# Patient Record
Sex: FEMALE | Race: WHITE | NOT HISPANIC OR LATINO | Employment: UNEMPLOYED | URBAN - NONMETROPOLITAN AREA
[De-identification: names, ages, dates, MRNs, and addresses within clinical notes are randomized per-mention and may not be internally consistent; named-entity substitution may affect disease eponyms.]

---

## 2019-11-07 ENCOUNTER — PROCEDURE VISIT (OUTPATIENT)
Dept: OBSTETRICS AND GYNECOLOGY | Facility: CLINIC | Age: 29
End: 2019-11-07

## 2019-11-07 VITALS
WEIGHT: 160 LBS | BODY MASS INDEX: 27.31 KG/M2 | DIASTOLIC BLOOD PRESSURE: 58 MMHG | SYSTOLIC BLOOD PRESSURE: 102 MMHG | HEIGHT: 64 IN

## 2019-11-07 DIAGNOSIS — Z01.419 ENCOUNTER FOR GYNECOLOGICAL EXAMINATION WITHOUT ABNORMAL FINDING: Primary | ICD-10-CM

## 2019-11-07 DIAGNOSIS — N92.0 MENORRHAGIA WITH REGULAR CYCLE: ICD-10-CM

## 2019-11-07 PROCEDURE — 87624 HPV HI-RISK TYP POOLED RSLT: CPT | Performed by: OBSTETRICS & GYNECOLOGY

## 2019-11-07 PROCEDURE — 99385 PREV VISIT NEW AGE 18-39: CPT | Performed by: OBSTETRICS & GYNECOLOGY

## 2019-11-07 PROCEDURE — G0123 SCREEN CERV/VAG THIN LAYER: HCPCS | Performed by: OBSTETRICS & GYNECOLOGY

## 2019-11-07 RX ORDER — NORGESTIMATE AND ETHINYL ESTRADIOL 0.25-0.035
1 KIT ORAL DAILY
Qty: 1 PACKAGE | Refills: 12 | Status: SHIPPED | OUTPATIENT
Start: 2019-11-07 | End: 2020-11-25 | Stop reason: SDUPTHER

## 2019-11-07 RX ORDER — VALACYCLOVIR HYDROCHLORIDE 500 MG/1
500 TABLET, FILM COATED ORAL EVERY 12 HOURS
Refills: 0 | COMMUNITY
Start: 2019-10-16 | End: 2019-11-07

## 2019-11-07 RX ORDER — BUSPIRONE HYDROCHLORIDE 10 MG/1
10 TABLET ORAL 2 TIMES DAILY
Refills: 1 | COMMUNITY
Start: 2019-10-16 | End: 2022-08-01

## 2019-11-07 NOTE — PROGRESS NOTES
[facility]  Gynecology    CC: Annual well woman exam    HPI  Mackenzie Richey is a 29 y.o.  premenopausal female who presents for her annual well woman exam.  The patient has no complaints other than some pain with removing super tampons.  Denies any vaginal itching, burning, irritation, or discharge.  Denies any abnormal uterine bleeding.  Continues to be sexually active.  Denies any sexual dysfunction concerns.  Denies any urinary symptoms including incontinence, dysuria, frequency, urgency, nocturia.    She exercises regularly: yes.  She wears her seat belt:yes.  She has concerns about domestic violence: no.  She has noticed changes in height: no.    ROS  Review of Systems   Constitutional: Negative.    HENT: Negative.    Eyes: Negative.    Respiratory: Negative.    Cardiovascular: Negative.    Gastrointestinal: Negative.    Endocrine: Negative.    Genitourinary: Negative.    Musculoskeletal: Negative.    Skin: Negative.    Allergic/Immunologic: Negative.    Neurological: Negative.    Hematological: Negative.    Psychiatric/Behavioral: The patient is nervous/anxious.      HEALTH MAINTENANCE  Mammogram: N/A  Colonoscopy: N/A  DEXA scan: N/A  Pap smear:   Last Completed Pap Smear       Status Date      PAP SMEAR Done 2011 CONVERTED (HISTORICAL) GYN CYTOLOGY     Patient has more history with this topic...        GYN HISTORY  Menarche: 9  Menses: Regular, every 28 days, lasts 4-5 days, first ++ heavy first 1-2 days, no intermenstrual bleeding  History of STIs: none  Last pap smear:   Last Completed Pap Smear       Status Date      PAP SMEAR Done 2011 CONVERTED (HISTORICAL) GYN CYTOLOGY     Patient has more history with this topic...      Abnormal pap smear history: 2017 per patient- ASCUS, repeat negative  Contraception: Condoms    OB HISTORY  OB History    Para Term  AB Living   2 1 1   1 1   SAB TAB Ectopic Molar Multiple Live Births   1         1      # Outcome  "Date GA Lbr Adebayo/2nd Weight Sex Delivery Anes PTL Lv   2 SAB      SAB         Complications: Molar pregnancy   1 Term 2012     CS-Unspec   NAYE        PAST MEDICAL HISTORY  Past Medical History:   Diagnosis Date   • No known problems      PAST SURGICAL HISTORY  Past Surgical History:   Procedure Laterality Date   •  SECTION     • D&C WITH SUCTION       FAMILY HISTORY  Family History   Problem Relation Age of Onset   • No Known Problems Father    • No Known Problems Mother    • No Known Problems Brother    • No Known Problems Sister    • Autism Child      SOCIAL HISTORY  Social History     Socioeconomic History   • Marital status:      Spouse name: Not on file   • Number of children: Not on file   • Years of education: Not on file   • Highest education level: Not on file   Tobacco Use   • Smoking status: Never Smoker   • Smokeless tobacco: Never Used   Substance and Sexual Activity   • Alcohol use: No     Frequency: Never   • Drug use: No   • Sexual activity: Yes     HOME MEDICATIONS  Prior to Admission medications    Not on File     ALLERGIES  No Known Allergies  PE  /58   Ht 162.6 cm (64\")   Wt 72.6 kg (160 lb)   LMP 10/29/2019 (Exact Date)   BMI 27.46 kg/m²        General: Alert, healthy, no distress, well nourished and well developed   Neurologic: Alert, oriented to person, place, and time.  Gait normal.  Cranial nerves II-XII grossly intact.  Lungs: Normal respiratory effort.  Clear to auscultation bilaterally.   Heart: Regular rate and rhythm, without murmer, rub or gallop.   Abdomen: Soft, non-tender, non-distended,no masses, no hepatosplenomegaly, no hernia.    Skin: No rash, no lesions.  Extremities: No cyanosis, clubbing or edema  PELVIC EXAM:  External Genitalia/Vulva: Anatomy is normal, no significant redness of labia, no discharge on vulvar tissues, [place]'s and Bartholin's glands are normal, no ulcers, no condylomatous lesions    Urethra: Normal, no lesions   Vagina: " Vaginal tissues are not inflamed, normal color and texture, no significant discharge present  Cervix: Normal in appearance without lesions or purulent discharge, no cervical motion tenderness    Uterus: Normal size, shape, and consistency.  Adnexa: Normal size and shape bilaterally, no palpable mass bilaterally and non-tender bilaterally     IMPRESSION  Mackenzie Richey is a 29 y.o.  presenting for annual gynecological exam.    PLAN    1. Encounter for gynecological examination without abnormal finding  - Liquid-based Pap Smear, Screening  - RTC 1 year  - Signed release of records for pap smears; per  at [facility] [place] in [place], her last pap smear was in  and normal    2. Menorrhagia with regular cycle  - norgestimate-ethinyl estradiol (SPRINTEC 28) 0.25-35 MG-MCG per tablet; Take 1 tablet by mouth Daily.  Dispense: 1 package; Refill: 12    This document has been electronically signed by Valerie Mendez MD on 2019 12:56 PM.    CC: Provider, No Known

## 2019-11-12 LAB
GEN CATEG CVX/VAG CYTO-IMP: NORMAL
LAB AP CASE REPORT: NORMAL
LAB AP GYN ADDITIONAL INFORMATION: NORMAL
PATH INTERP SPEC-IMP: NORMAL
STAT OF ADQ CVX/VAG CYTO-IMP: NORMAL

## 2019-11-14 LAB — HPV I/H RISK 4 DNA CVX QL PROBE+SIG AMP: NEGATIVE

## 2020-11-24 ENCOUNTER — TELEPHONE (OUTPATIENT)
Dept: OBSTETRICS AND GYNECOLOGY | Facility: CLINIC | Age: 30
End: 2020-11-24

## 2020-11-24 DIAGNOSIS — N92.0 MENORRHAGIA WITH REGULAR CYCLE: ICD-10-CM

## 2020-11-24 NOTE — TELEPHONE ENCOUNTER
PT IS NEEDING A BC REFILL UNTIL SHE COMES IN FOR HER ANNUAL.  SHE USES [facility] IN [place].    THANK YOU!

## 2020-11-25 RX ORDER — NORGESTIMATE AND ETHINYL ESTRADIOL 0.25-0.035
1 KIT ORAL DAILY
Qty: 1 PACKAGE | Refills: 12 | Status: SHIPPED | OUTPATIENT
Start: 2020-11-25 | End: 2021-01-20 | Stop reason: SDUPTHER

## 2021-01-20 ENCOUNTER — OFFICE VISIT (OUTPATIENT)
Dept: OBSTETRICS AND GYNECOLOGY | Facility: CLINIC | Age: 31
End: 2021-01-20

## 2021-01-20 VITALS
SYSTOLIC BLOOD PRESSURE: 122 MMHG | BODY MASS INDEX: 28.51 KG/M2 | DIASTOLIC BLOOD PRESSURE: 66 MMHG | WEIGHT: 167 LBS | HEIGHT: 64 IN

## 2021-01-20 DIAGNOSIS — N92.0 MENORRHAGIA WITH REGULAR CYCLE: ICD-10-CM

## 2021-01-20 DIAGNOSIS — Z01.419 WOMEN'S ANNUAL ROUTINE GYNECOLOGICAL EXAMINATION: Primary | ICD-10-CM

## 2021-01-20 PROCEDURE — 99395 PREV VISIT EST AGE 18-39: CPT | Performed by: OBSTETRICS & GYNECOLOGY

## 2021-01-20 RX ORDER — NORGESTIMATE AND ETHINYL ESTRADIOL 0.25-0.035
1 KIT ORAL DAILY
Qty: 3 PACKAGE | Refills: 3 | Status: SHIPPED | OUTPATIENT
Start: 2021-01-20 | End: 2022-08-01 | Stop reason: HOSPADM

## 2021-01-20 NOTE — PROGRESS NOTES
[facility]  Gynecology    CC: Annual well woman exam    HPI  Mackenzie Richey is a 30 y.o.  premenopausal female who presents for her annual well woman exam and birth control refill.  The patient has no complaints.  Denies any vaginal itching, burning, irritation, or discharge.  Denies any abnormal uterine bleeding.  Continues to be sexually active.  Denies any sexual dysfunction concerns.  Denies any urinary symptoms including incontinence, dysuria, frequency, urgency, nocturia.    She exercises regularly: yes.  She wears her seat belt:yes.  She has concerns about domestic violence: no.  She has noticed changes in height: no.    She does report that with this last period, she did have bleeding in between her periods.  This has not happened before, it was only with this past cycle.    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.    ROS  Review of Systems   Constitutional: Negative.    HENT: Negative.    Eyes: Negative.    Respiratory: Negative.    Cardiovascular: Negative.    Gastrointestinal: Negative.    Endocrine: Negative.    Genitourinary: Negative.    Musculoskeletal: Negative.    Skin: Negative.    Neurological: Negative.    Hematological: Negative.    Psychiatric/Behavioral: Negative.       HEALTH MAINTENANCE  Mammogram: N/A  Colonoscopy: N/A  DEXA scan: N/A  Pap smear:   Last Completed Pap Smear       Status Date      PAP SMEAR Done 2019 LIQUID-BASED PAP SMEAR, SCREENING     Patient has more history with this topic...        GYN HISTORY  Menarche: age 9  Menses: Regular, every 28 days, lasts 4 days, normal flow, no intermenstrual bleeding  History of STIs: none  Last pap smear: 2019- neg cytology, neg HRHPV  Abnormal pap smear history: 2017 per patient- ASCUS, repeat negative  Contraception: Condoms, OCPs    OB HISTORY  OB History    Para Term  AB Living   2 1 1   1 1   SAB TAB Ectopic Molar Multiple Live Births   1         1      # Outcome Date GA Lbr  "Adebayo/2nd Weight Sex Delivery Anes PTL Lv   2 SAB      SAB         Complications: Molar pregnancy   1 Term      CS-Unspec   NAYE     PAST MEDICAL HISTORY  Past Medical History:   Diagnosis Date   • No known problems      PAST SURGICAL HISTORY  Past Surgical History:   Procedure Laterality Date   •  SECTION     • D&C WITH SUCTION       FAMILY HISTORY  Family History   Problem Relation Age of Onset   • No Known Problems Father    • No Known Problems Mother    • No Known Problems Brother    • No Known Problems Sister    • Autism Child      SOCIAL HISTORY  Social History     Socioeconomic History   • Marital status:      Spouse name: Not on file   • Number of children: Not on file   • Years of education: Not on file   • Highest education level: Not on file   Tobacco Use   • Smoking status: Never Smoker   • Smokeless tobacco: Never Used   Substance and Sexual Activity   • Alcohol use: No     Frequency: Never   • Drug use: No   • Sexual activity: Yes     HOME MEDICATIONS  Prior to Admission medications    Medication Sig Start Date End Date Taking? Authorizing Provider   busPIRone (BUSPAR) 10 MG tablet Take 10 mg by mouth 2 (Two) Times a Day. 10/16/19  Yes Provider, MD Trent   norgestimate-ethinyl estradiol (Sprintec 28) 0.25-35 MG-MCG per tablet Take 1 tablet by mouth Daily. 21  Yes Valerie Mendez MD   norgestimate-ethinyl estradiol (Sprintec 28) 0.25-35 MG-MCG per tablet Take 1 tablet by mouth Daily. 20 Yes Valerie Mendez MD     ALLERGIES  No Known Allergies    PE  /66   Ht 162.6 cm (64\")   Wt 75.8 kg (167 lb)   LMP 2021 (Exact Date)   BMI 28.67 kg/m²        General: Alert, healthy, no distress, well nourished and well developed   Neurologic: Alert, oriented to person, place, and time.  Gait normal.  Cranial nerves II-XII grossly intact.  HEENT: Normocephalic, atraumatic.  Extraocular muscles intact, pupils equal and " reactive times two.    Neck: Supple, no adenopathy, thyroid normal size, non-tender, without nodularity, trachea midline   Breasts: Declined  Lungs: Normal respiratory effort.  Clear to auscultation bilaterally.   Heart: Regular rate and rhythm, without murmer, rub or gallop.   Abdomen: Soft, non-tender, non-distended,no masses, no hepatosplenomegaly, no hernia.    Skin: No rash, no lesions.  Extremities: No cyanosis, clubbing or edema  PELVIC EXAM: Declined by patient    IMPRESSION  Mackenzie Richey is a 30 y.o.  presenting with annual gynecological exam and BCP refill.    PLAN    1. Women's annual routine gynecological examination    2. Menorrhagia with regular cycle  Discussed that some spotting b/w cycles can be normal.  She will let us know if this is recurrent.  - norgestimate-ethinyl estradiol (Sprintec 28) 0.25-35 MG-MCG per tablet; Take 1 tablet by mouth Daily.  Dispense: 3 package; Refill: 3    This document has been electronically signed by Valerie Mendez MD on 2021 15:01 CST.    CC: Eunice Ambrocio APRN

## 2022-07-21 ENCOUNTER — LAB (OUTPATIENT)
Dept: LAB | Facility: HOSPITAL | Age: 32
End: 2022-07-21

## 2022-07-21 ENCOUNTER — TRANSCRIBE ORDERS (OUTPATIENT)
Dept: LAB | Facility: HOSPITAL | Age: 32
End: 2022-07-21

## 2022-07-21 DIAGNOSIS — Z00.00 WELLNESS EXAMINATION: ICD-10-CM

## 2022-07-21 DIAGNOSIS — Z00.00 WELLNESS EXAMINATION: Primary | ICD-10-CM

## 2022-07-21 PROCEDURE — 80050 GENERAL HEALTH PANEL: CPT

## 2022-07-21 PROCEDURE — 80061 LIPID PANEL: CPT

## 2022-07-21 PROCEDURE — 84439 ASSAY OF FREE THYROXINE: CPT

## 2022-07-21 PROCEDURE — 36415 COLL VENOUS BLD VENIPUNCTURE: CPT

## 2022-07-22 LAB
ALBUMIN SERPL-MCNC: 4.5 G/DL (ref 3.5–5.2)
ALBUMIN/GLOB SERPL: 1.8 G/DL
ALP SERPL-CCNC: 45 U/L (ref 39–117)
ALT SERPL W P-5'-P-CCNC: 13 U/L (ref 1–33)
ANION GAP SERPL CALCULATED.3IONS-SCNC: 10.3 MMOL/L (ref 5–15)
AST SERPL-CCNC: 17 U/L (ref 1–32)
BASOPHILS # BLD AUTO: 0.04 10*3/MM3 (ref 0–0.2)
BASOPHILS NFR BLD AUTO: 0.9 % (ref 0–1.5)
BILIRUB SERPL-MCNC: 0.6 MG/DL (ref 0–1.2)
BUN SERPL-MCNC: 12 MG/DL (ref 6–20)
BUN/CREAT SERPL: 15.2 (ref 7–25)
CALCIUM SPEC-SCNC: 8.8 MG/DL (ref 8.6–10.5)
CHLORIDE SERPL-SCNC: 104 MMOL/L (ref 98–107)
CHOLEST SERPL-MCNC: 159 MG/DL (ref 0–200)
CO2 SERPL-SCNC: 24.7 MMOL/L (ref 22–29)
CREAT SERPL-MCNC: 0.79 MG/DL (ref 0.57–1)
DEPRECATED RDW RBC AUTO: 42.5 FL (ref 37–54)
EGFRCR SERPLBLD CKD-EPI 2021: 100.8 ML/MIN/1.73
EOSINOPHIL # BLD AUTO: 0.12 10*3/MM3 (ref 0–0.4)
EOSINOPHIL NFR BLD AUTO: 2.6 % (ref 0.3–6.2)
ERYTHROCYTE [DISTWIDTH] IN BLOOD BY AUTOMATED COUNT: 13.1 % (ref 12.3–15.4)
GLOBULIN UR ELPH-MCNC: 2.5 GM/DL
GLUCOSE SERPL-MCNC: 74 MG/DL (ref 65–99)
HCT VFR BLD AUTO: 37.5 % (ref 34–46.6)
HDLC SERPL-MCNC: 55 MG/DL (ref 40–60)
HGB BLD-MCNC: 12.2 G/DL (ref 12–15.9)
IMM GRANULOCYTES # BLD AUTO: 0.01 10*3/MM3 (ref 0–0.05)
IMM GRANULOCYTES NFR BLD AUTO: 0.2 % (ref 0–0.5)
LDLC SERPL CALC-MCNC: 92 MG/DL (ref 0–100)
LDLC/HDLC SERPL: 1.68 {RATIO}
LYMPHOCYTES # BLD AUTO: 1.58 10*3/MM3 (ref 0.7–3.1)
LYMPHOCYTES NFR BLD AUTO: 34.5 % (ref 19.6–45.3)
MCH RBC QN AUTO: 29 PG (ref 26.6–33)
MCHC RBC AUTO-ENTMCNC: 32.5 G/DL (ref 31.5–35.7)
MCV RBC AUTO: 89.3 FL (ref 79–97)
MONOCYTES # BLD AUTO: 0.31 10*3/MM3 (ref 0.1–0.9)
MONOCYTES NFR BLD AUTO: 6.8 % (ref 5–12)
NEUTROPHILS NFR BLD AUTO: 2.52 10*3/MM3 (ref 1.7–7)
NEUTROPHILS NFR BLD AUTO: 55 % (ref 42.7–76)
NRBC BLD AUTO-RTO: 0 /100 WBC (ref 0–0.2)
PLATELET # BLD AUTO: 228 10*3/MM3 (ref 140–450)
PMV BLD AUTO: 11.6 FL (ref 6–12)
POTASSIUM SERPL-SCNC: 4.2 MMOL/L (ref 3.5–5.2)
PROT SERPL-MCNC: 7 G/DL (ref 6–8.5)
RBC # BLD AUTO: 4.2 10*6/MM3 (ref 3.77–5.28)
SODIUM SERPL-SCNC: 139 MMOL/L (ref 136–145)
T4 FREE SERPL-MCNC: 1.31 NG/DL (ref 0.93–1.7)
TRIGL SERPL-MCNC: 59 MG/DL (ref 0–150)
TSH SERPL DL<=0.05 MIU/L-ACNC: 1.38 UIU/ML (ref 0.27–4.2)
VLDLC SERPL-MCNC: 12 MG/DL (ref 5–40)
WBC NRBC COR # BLD: 4.58 10*3/MM3 (ref 3.4–10.8)

## 2022-08-01 ENCOUNTER — OFFICE VISIT (OUTPATIENT)
Dept: OBSTETRICS AND GYNECOLOGY | Facility: CLINIC | Age: 32
End: 2022-08-01

## 2022-08-01 VITALS
DIASTOLIC BLOOD PRESSURE: 72 MMHG | WEIGHT: 148.8 LBS | BODY MASS INDEX: 26.36 KG/M2 | HEIGHT: 63 IN | SYSTOLIC BLOOD PRESSURE: 118 MMHG

## 2022-08-01 DIAGNOSIS — Z53.8 PAP SMEAR OF CERVIX NOT NEEDED: ICD-10-CM

## 2022-08-01 DIAGNOSIS — Z30.09 ENCOUNTER FOR COUNSELING REGARDING CONTRACEPTION: ICD-10-CM

## 2022-08-01 DIAGNOSIS — Z01.419 WOMEN'S ANNUAL ROUTINE GYNECOLOGICAL EXAMINATION: Primary | ICD-10-CM

## 2022-08-01 PROCEDURE — 99395 PREV VISIT EST AGE 18-39: CPT | Performed by: OBSTETRICS & GYNECOLOGY

## 2022-08-01 RX ORDER — NORETHINDRONE ACETATE AND ETHINYL ESTRADIOL 1; .02 MG/1; MG/1
1 TABLET ORAL DAILY
Qty: 21 TABLET | Refills: 12 | Status: SHIPPED | OUTPATIENT
Start: 2022-08-01 | End: 2023-08-01

## 2022-08-01 NOTE — PROGRESS NOTES
[facility]  Gynecology    CC: Annual well woman exam    HPI  Mackenzie Richey is a 32 y.o.  premenopausal female who presents for her annual well woman exam.  The patient has no complaints.  Denies any vaginal itching, burning, irritation, or discharge.  Denies any abnormal uterine bleeding.  Continues to be sexually active.  Denies any sexual dysfunction concerns.  Denies any urinary symptoms including incontinence, dysuria, frequency, urgency, nocturia.    She exercises regularly: yes.  She wears her seat belt:yes.  She has concerns about domestic violence: no.  She has noticed changes in height: no.    She stopped her BCP because she was worried about risks of the pills but she does not want to have a baby.  They do use condoms.  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    ROS  Review of Systems   Constitutional: Negative.    HENT: Negative.    Eyes: Negative.    Respiratory: Negative.    Cardiovascular: Negative.    Gastrointestinal: Negative.    Endocrine: Negative.    Genitourinary: Negative.    Musculoskeletal: Negative.    Skin: Negative.    Allergic/Immunologic: Negative.    Neurological: Negative.    Hematological: Negative.    Psychiatric/Behavioral: Negative.       HEALTH MAINTENANCE  Mammogram: N/A  Colonoscopy: N/A  DEXA scan: N/A  Pap smear: 2019    GYN HISTORY  Menarche: age 9  Menses: Regular, every 28 days, lasts 4 days, normal flow, no intermenstrual bleeding  History of STIs: none  Last pap smear: 2019- neg cytology, neg HRHPV  Abnormal pap smear history: 2017 per patient- ASCUS, repeat negative  Contraception: Condoms    OB HISTORY  OB History    Para Term  AB Living   2 1 1   1 1   SAB IAB Ectopic Molar Multiple Live Births   1         1      # Outcome Date GA Lbr Adebayo/2nd Weight Sex Delivery Anes PTL Lv   2 SAB 2015     SAB         Complications: Molar pregnancy   1 Term      CS-Unspec   NAYE     PAST MEDICAL HISTORY  Past  "Medical History:   Diagnosis Date   • No known problems      PAST SURGICAL HISTORY  Past Surgical History:   Procedure Laterality Date   •  SECTION     • D & C WITH SUCTION       FAMILY HISTORY  Family History   Problem Relation Age of Onset   • No Known Problems Father    • No Known Problems Mother    • No Known Problems Brother    • No Known Problems Sister    • Autism Child      SOCIAL HISTORY  Social History     Socioeconomic History   • Marital status:    Tobacco Use   • Smoking status: Never Smoker   • Smokeless tobacco: Never Used   Substance and Sexual Activity   • Alcohol use: Yes     Comment: socially   • Drug use: No   • Sexual activity: Yes     Birth control/protection: Condom     HOME MEDICATIONS  Prior to Admission medications    Medication Sig Start Date End Date Taking? Authorizing Provider   busPIRone (BUSPAR) 10 MG tablet Take 10 mg by mouth 2 (Two) Times a Day. 10/16/19  Yes ProviderTrent MD   norgestimate-ethinyl estradiol (Sprintec 28) 0.25-35 MG-MCG per tablet Take 1 tablet by mouth Daily. 21  Yes Valerie Mendez MD   norgestimate-ethinyl estradiol (Sprintec 28) 0.25-35 MG-MCG per tablet Take 1 tablet by mouth Daily. 20 Yes Valerie Mendez MD     ALLERGIES  No Known Allergies    PE  /72   Ht 160 cm (63\")   Wt 67.5 kg (148 lb 12.8 oz)   BMI 26.36 kg/m²        General: Alert, healthy, no distress, well nourished and well developed   Neurologic: Alert, oriented to person, place, and time.  Gait normal.  Cranial nerves II-XII grossly intact.  HEENT: Normocephalic, atraumatic.  Extraocular muscles intact, pupils equal and reactive times two.    Neck: Supple, no adenopathy, thyroid normal size, non-tender, without nodularity, trachea midline   Breasts: No masses, skin dimpling, skin retraction, nipple discharge, or asymmetry bilaterally.  Lungs: Normal respiratory effort.  Clear to auscultation bilaterally. "   Heart: Regular rate and rhythm, without murmer, rub or gallop.   Abdomen: Soft, non-tender, non-distended,no masses, no hepatosplenomegaly, no hernia.    Skin: No rash, no lesions.  Extremities: No cyanosis, clubbing or edema  External Genitalia/Vulva: Anatomy is normal, no significant redness of labia, no discharge on vulvar tissues, [place]'s and Bartholin's glands are normal, no ulcers, no condylomatous lesions.  Urethral meatus: Normal, no lesions, no prolapse.  Urethra: Normal, no masses, no tenderness with palpation.  Bladder: Normal, no fullness, no masses, no tenderness with palpation.  Vagina: Vaginal tissues are not inflamed, normal color and texture, no significant discharge present.  Pelvic support adequate.  Cervix: Normal, no lesions, no purulent discharge, no cervical motion tenderness.  Uterus: Normal size, shape, and consistency.  Good mobility noted.  Minimal descent noted with good support.  Adnexa: Normal size and shape bilaterally, no palpable mass bilaterally and non-tender bilaterally.  Rectal: Normal, no masses or polyps, confirms bimanual exam, perianal normal, no lesions; GALLITO deferred.    IMPRESSION  Mackenzie Richey is a 32 y.o.  presenting with annual gynecological exam.    PLAN    1. Women's annual routine gynecological examination  - Counseling performed  - RTC 1 year for annual    2. Encounter for counseling regarding contraception  Discussed that there are risks of OCPs (stroke, breast cancer, MI) but is a low risk.  I reviewed that she is healthy and has no risk factors for those things.  I offered to do low-dose OCPs; she would like to do that.  - norethindrone-ethinyl estradiol (Loestrin , ,) 1-20 MG-MCG per tablet; Take 1 tablet by mouth Daily.  Dispense: 21 tablet; Refill: 12    3. Pap smear of cervix not needed  Pap smear due in .    Counseling provided:  family planning/contraception and screenings     This document has been electronically signed by Valerie  MD Andrea on August 1, 2022 11:12 CDT.    CC: Eunice Ambrocio, CARLOTTA

## 2023-04-20 ENCOUNTER — OFFICE VISIT (OUTPATIENT)
Dept: ORTHOPEDIC SURGERY | Facility: CLINIC | Age: 33
End: 2023-04-20
Payer: COMMERCIAL

## 2023-04-20 VITALS — WEIGHT: 145 LBS | HEIGHT: 64 IN | BODY MASS INDEX: 24.75 KG/M2

## 2023-04-20 DIAGNOSIS — S93.491A SPRAIN OF ANTERIOR TALOFIBULAR LIGAMENT OF RIGHT ANKLE, INITIAL ENCOUNTER: ICD-10-CM

## 2023-04-20 DIAGNOSIS — M25.571 ACUTE RIGHT ANKLE PAIN: Primary | ICD-10-CM

## 2023-04-20 PROBLEM — F41.9 ANXIETY: Status: ACTIVE | Noted: 2019-11-08

## 2023-04-20 RX ORDER — MELOXICAM 15 MG/1
15 TABLET ORAL DAILY
Qty: 30 TABLET | Refills: 0 | Status: SHIPPED | OUTPATIENT
Start: 2023-04-20

## 2023-04-20 NOTE — PROGRESS NOTES
Mackenzie Richey is a 33 y.o. female   Primary provider:  Eunice Ambrocio APRN       Chief Complaint   Patient presents with   • Right Ankle - Initial Evaluation       HISTORY OF PRESENT ILLNESS: This 33-year-old female patient presents today with complaints of right ankle pain.  The patient reports this pain started on 3/29/2023 when she fell and twisted her ankle.  The patient reports she rolled her ankle when she was carrying her dog from the groomer, while walking down the steps, she is missed the last step and rolled her ankle.  Patient reports she followed up with the urgent care previously at [facility] where she was diagnosed with a sprained ankle.  The patient reports she was ordered ibuprofen and a Medrol pack.  The patient reports she has completed the Medrol pack.  The patient reports the ibuprofen does help but the pain returns.  Patient reports her pain and swelling have slightly improved but if she walks on her foot for very long, her swelling and pain increases.  Denies numbness and tingling.  Denies fever and chills.  Patient describes her pain as mild, intermittent with aching, burning, bruising and swelling.  Walking makes her pain worse.  The patient reports she has tried ibuprofen, ice and rest with mild relief.  Weightbearing activity without ambulatory device or brace.    Answers for HPI/ROS submitted by the patient on 4/18/2023  What is the primary reason for your visit?: Lower Extremity Injury  Incident occurred: more than 1 week ago  Incident location: other  Injury mechanism: a fall  Pain location: right ankle      Lower Extremity Issue  Associated symptoms comments: Bruising and swelling. The symptoms are aggravated by walking (walking/running). She has tried ice, heat, acetaminophen, NSAIDs and rest for the symptoms.        CONCURRENT MEDICAL HISTORY:    Past Medical History:   Diagnosis Date   • No known problems        No Known Allergies      Current  "Outpatient Medications:   •  meloxicam (MOBIC) 15 MG tablet, Take 1 tablet by mouth Daily., Disp: 30 tablet, Rfl: 0    Past Surgical History:   Procedure Laterality Date   •  SECTION     • D & C WITH SUCTION         Family History   Problem Relation Age of Onset   • No Known Problems Father    • No Known Problems Mother    • No Known Problems Brother    • No Known Problems Sister    • Autism Child        Social History     Socioeconomic History   • Marital status:    Tobacco Use   • Smoking status: Never   • Smokeless tobacco: Never   Substance and Sexual Activity   • Alcohol use: Yes     Comment: socially   • Drug use: No   • Sexual activity: Yes     Birth control/protection: Condom        Review of Systems    PHYSICAL EXAMINATION:       Ht 162.6 cm (64\")   Wt 65.8 kg (145 lb)   BMI 24.89 kg/m²     Physical Exam  Nursing note reviewed.   Constitutional:       Appearance: Normal appearance. She is normal weight.   HENT:      Mouth/Throat:      Mouth: Mucous membranes are moist.   Eyes:      Pupils: Pupils are equal, round, and reactive to light.   Pulmonary:      Effort: Pulmonary effort is normal.   Abdominal:      General: Abdomen is flat.   Skin:     Capillary Refill: Capillary refill takes 2 to 3 seconds.   Neurological:      General: No focal deficit present.      Mental Status: She is alert and oriented to person, place, and time.   Psychiatric:         Mood and Affect: Mood normal.         Behavior: Behavior normal.         GAIT:     []  Normal  [x]  Antalgic    Assistive device: [x]  None  []  Walker     []  Crutches  []  Cane     []  Wheelchair  []  Stretcher    Right Ankle Exam     Tenderness   The patient is experiencing tenderness in the ATF.  Swelling: mild    Range of Motion   Dorsiflexion: normal   Plantar flexion: abnormal   Eversion: normal   Inversion: abnormal     Comments:  Mild limited mobility and strength.  Tenderness most predominant at the ATF ligament.  Skin is " warm, dry and intact.  Neurovascular intact.                  XR Ankle 3+ View Right    Result Date: 3/29/2023  Narrative: Three view right ankle HISTORY: Pain and swelling after falling AP, lateral and oblique views obtained. COMPARISON: None FINDINGS: Minimal soft tissues adjacent to the lateral malleolus. Small anterior tibiotalar joint effusion. No fracture or dislocation. No other osseous or articular abnormality.     Impression: CONCLUSION: Minimal soft tissues adjacent to the lateral malleolus. Small anterior tibiotalar joint effusion. No fracture or dislocation. [id number] Electronically signed by:  Jerardo Watkins MD  3/29/2023 5:05 PM CDT Workstation: [id number]          ASSESSMENT:    Diagnoses and all orders for this visit:    Acute right ankle pain  -     XR Ankle 3+ View Right  -     Ambulatory Referral to Physical Therapy Evaluate and treat  -     meloxicam (MOBIC) 15 MG tablet; Take 1 tablet by mouth Daily.    Sprain of anterior talofibular ligament of right ankle, initial encounter  -     Ambulatory Referral to Physical Therapy Evaluate and treat  -     meloxicam (MOBIC) 15 MG tablet; Take 1 tablet by mouth Daily.          PLAN  Based on the patient's symptoms and concern for her swelling and pain.  Recommend the patient have a repeat x-ray.  Repeat x-ray of the right ankle obtained, reviewed and discussed with the patient.  No acute findings are noted.  Recommended the patient be placed in a stirrup Aircast.  Recommended to use for activity.  Advised patient physical therapy would help assist in guiding her activity and range of motion with progressing strengthening activity as tolerated.  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  Referral sent to physical therapy at sports medicine.  Evaluate and treat.  Progress as tolerated.  Recommended to modify activity as needed.  Encouraged to rest, ice and elevate at least 3-4 times a day or  more if time allows.  Recommended the patient to stop the ibuprofen.  Recommended to start meloxicam daily for 2 weeks.  Recommend starting a prescription oral NSAID for improved control of pain/inflammation. Meloxicam is prescribed today. Patient is instructed to take the medication daily. Patient is instructed to take the medication with food to minimize any potential GI upset. Patient is instructed not to take any additional NSAIDs, such as Ibuprofen or Aleve, while taking Meloxicam. Patient can also take Tylenol as needed for additional pain control.   Also discussed the use of topical analgesics such as Salonpas or Biofreeze.  May soak in Epsom salt.  Recommended follow-up in 4 weeks for reevaluation.  May follow-up sooner as needed if symptoms change or worsen or for new complaint.    All questions and concerns are addressed with understanding noted. They are aware and are in agreement to this plan.    Return in about 4 weeks (around 5/18/2023) for Recheck.    CARLOTTA Arellano    This document has been electronically signed by CARLOTTA Arellano on April 20, 2023 10:12 CDT      EMR Dragon/Transciption Disclaimer: Some of this note may be an electronic transcription/translation of spoken language to printed text using the Dragon Dictation System.     Time spent of a minimum of 30 minutes including the face to face evaluation, reviewing of medical history and prior medial records, reviewing of diagnostic studies, documentation, patient education and coordination of care.

## 2023-04-27 ENCOUNTER — HOSPITAL ENCOUNTER (OUTPATIENT)
Dept: PHYSICAL THERAPY | Facility: HOSPITAL | Age: 33
Setting detail: THERAPIES SERIES
Discharge: HOME | End: 2023-04-27
Payer: COMMERCIAL

## 2023-04-27 DIAGNOSIS — M25.571 ACUTE RIGHT ANKLE PAIN: Primary | ICD-10-CM

## 2023-04-27 DIAGNOSIS — S93.491A SPRAIN OF ANTERIOR TALOFIBULAR LIGAMENT OF RIGHT ANKLE, INITIAL ENCOUNTER: ICD-10-CM

## 2023-04-27 PROCEDURE — 97161 PT EVAL LOW COMPLEX 20 MIN: CPT

## 2023-04-27 NOTE — THERAPY EVALUATION
Outpatient Physical Therapy Ortho Initial Evaluation  [facility]     Patient Name: Mackenzie Richey  : 1990  MRN: [id number]  Today's Date: 2023      Visit Date: 2023   Attendance:  (TBD approved)  Subjective Improvement: n/a  Next MD Visit: 23  Recert Date: 23    Therapy Diagnosis: R ankle sprain      Patient Active Problem List   Diagnosis   • Anxiety        Past Medical History:   Diagnosis Date   • No known problems         Past Surgical History:   Procedure Laterality Date   •  SECTION     • D & C WITH SUCTION       Current Outpatient Medications   Medication Instructions   • cyclobenzaprine (FLEXERIL) 10 MG tablet Take 1 tablet by mouth Every 8 (Eight) Hours As Needed for muscle spasms.   • doxycycline (MONODOX) 100 MG capsule Take 1 capsule by mouth 2 (Two) Times a Day until gone.   • meloxicam (MOBIC) 15 MG tablet Take 1 tablet by mouth Daily **Take with food**   • ondansetron ODT (ZOFRAN-ODT) 8 MG disintegrating tablet Dissolve 1 tablet on the tongue every 8 (eight) hours as needed for nausea.       No Known Allergies      Visit Dx:     ICD-10-CM ICD-9-CM   1. Acute right ankle pain  M25.571 719.47     338.19   2. Sprain of anterior talofibular ligament of right ankle, initial encounter  S93.491A 845.09          Patient History     Row Name 23 0800             History    Chief Complaint Pain  -      Type of Pain Ankle pain  -      Date Current Problem(s) Began 23  -      Brief Description of Current Complaint Pt stated that on 3/29/23, she was caring her dog out of the groomer when she missed stepped off the sidewalk and rolled her ankle in. Only one prior hx of a mild ankle roll back in December that resolved quickly on its own. No hx of ankle surgeries. Pt does have an ankle stabilizing brace that she wears when she has to walk a lot.  female living her spouse and two sons (11 and 17 y/o). Pt lives in a two story home.   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 X-ray  -      Results of Clinical Tests Per imaging report of  ankle x-ray on 4/20/23: “No acute fracture or dislocation.  Talar dome is intact.  Joint spaces appear maintained.  Soft tissues appear grossly unremarkable.”  -         Pain     Pain Location Ankle  right  -      Pain at Present 1  -      Pain at Best 0  -      Pain at Worst 5  -      Pain Frequency Intermittent  -      Pain Description Aching  Sensitive to touch  -      What Performance Factors Make the Current Problem(s) WORSE? prolong walking/standing, twist/pivot, ankle motions, pushing gas/break, and standing on one leg.  -      What Performance Factors Make the Current Problem(s) BETTER? rest, ice  -      Tolerance Time- Standing 30-45 min  -      Tolerance Time- Walking 15-20 min  -      Is your sleep disturbed? No  -      Is medication used to assist with sleep? No  -      Difficulties at work? n/a  -      Difficulties with ADL's? no issues  -      Difficulties with recreational activities? unable  -         Fall Risk Assessment    Any falls in the past year: Yes  -      Number of falls reported in the last 12 months 2  -      Factors that contributed to the fall: --  miss stepped and rolled ankle  -            User Key  (r) = Recorded By, (t) = Taken By, (c) = Cosigned By    Initials Name Provider Type     Helena Smith, PT Physical Therapist                 PT Ortho     Row Name 04/27/23 0800       Subjective Comments    Subjective Comments See therapy pt hx  -        Subjective Pain    Able to rate subjective pain? yes  -    Pre-Treatment Pain Level 1  -    Post-Treatment Pain Level 1  -       Posture/Observations    Posture/Observations Comments Rest with ankle IV. TTP post and ant lateral malleolus. Mod pocket of edema ant to lat malleolus. No TTP or increased muscel tension noted elsewhere.  -       Ankle/Foot Special Tests    Anterior drawer (ATFL lesion) Negative  -    Talar tilt test (instability) Negative  -    Inversion Stress Test Positive  -    Eversion Stress Test Negative  -       General ROM    GENERAL ROM COMMENTS Bilat hip and knee AROM WNL grossly  -MH       Right Lower Ext    Rt Ankle Dorsiflexion AROM 5 deg  -    Rt Ankle Plantarflexion AROM 46 deg  -    Rt Ankle Inversion AROM 22 deg  -    Rt Ankle Eversion AROM 17 deg  -    RT Lower Extremity Comments Toe WNL  -       Left Lower Ext    Lt Ankle Dorsiflexion AROM 10 deg  -    Lt Ankle Plantarflexion AROM 58 deg  -    Lt Ankle Inversion AROM 34 deg  -    Lt Ankle Eversion AROM 20 deg  -       MMT (Manual Muscle Testing)    Rt Lower Ext Rt Hip Flexion;Rt Hip Extension;Rt Hip ABduction;Rt Hip ADduction;Rt Hip Internal (Medial) Rotation;Rt Hip External (Lateral) Rotation;Rt Knee Extension;Rt Knee Flexion;Rt Ankle Plantarflexion;Rt Ankle Dorsiflexion;Rt Ankle Subtalar Inversion;Rt Ankle Subtalar Eversion  -    General MMT Comments LLE 5/5 grossly  -       MMT Right Lower Ext    Rt Hip Flexion MMT, Gross Movement (5/5) normal  -    Rt Hip Extension MMT, Gross Movement (5/5) normal  -    Rt Hip ABduction MMT, Gross Movement (5/5) normal  -    Rt Hip ADduction MMT, Gross Movement (5/5) normal  -    Rt Hip Internal (Medial) Rotation MMT, Gross Movement (5/5) normal  -    Rt Hip External (Lateral) Rotation MMT, Gross Movement (5/5) normal  -    Rt Knee Extension MMT, Gross Movement (5/5) normal  -    Rt Knee Flexion MMT, Gross Movement (5/5) normal  -    Rt Ankle  Plantarflexion MMT, Gross Movement (4/5) good  -MH    Rt Ankle Dorsiflexion MMT, Gross Movement (4+/5) good plus  -MH    Rt Ankle Subtalar Inversion MT, Gross Movement (4/5) good  -MH    Rt Ankle Subtalar Eversion MMT, Gross Movement (4/5) good  -MH       Sensation    Sensation WNL? WNL  -MH    Light Touch No apparent deficits  -MH       Flexibility    Flexibility Tested? Lower Extremity  -MH       Lower Extremity Flexibility    Gastrocnemius Right:;Mildly limited  -MH    Soleus Right:;Mildly limited  -MH       Balance Skills Training    SLS 10 sec with increased ankle stratigies and increased IV/sup  -MH       Transfers    Comment, (Transfers) Indpt with all transfers  -       Gait/Stairs (Locomotion)    [place] Level (Gait) independent  -    Comment, (Gait/Stairs) ankle iv/sup  -MH          User Key  (r) = Recorded By, (t) = Taken By, (c) = Cosigned By    Initials Name Provider Type    Helena Borges, LEONEL Physical Therapist                            Therapy Education  Education Details: HEP: ROM DF/PF IV/EV, gastroc S with strap, standing HR/TR  Given: HEP  Program: New  How Provided: Verbal, Demonstration, Written  Provided to: Patient  Level of Understanding: Verbalized      PT OP Goals     Row Name 04/27/23 0800          PT Short Term Goals    STG Date to Achieve 05/25/23  -     STG 1 Pt will be able to maintain SLD on foam mat for 10 sec without UE use and without falling into IV/supination.  -     STG 1 Progress New  -     STG 2 PT’s ankle MMT will improve to 5/5 grossly.  -     STG 2 Progress New  -     STG 3 Pt’s DF AROM will improve to at least 8 deg.  -     STG 3 Progress New  -     STG 4 Pt's PF AROM will improve to at least 50 deg  -     STG 4 Progress New  -        Time Calculation    PT Goal Re-Cert Due Date 05/18/23  -           User Key  (r) = Recorded By, (t) = Taken By, (c) = Cosigned By    Initials Name Provider Type    Helena Borges, LEONEL Physical Therapist                  PT Assessment/Plan     Row Name 04/27/23 0800          PT Assessment    Functional Limitations Impaired gait;Performance in leisure activities;Performance in work activities  -     Impairments Balance;Edema;Gait;Impaired flexibility;Impaired muscle endurance;Impaired muscle length;Impaired muscle power;Joint integrity;Joint mobility;Muscle strength;Pain;Poor body mechanics;Posture;Range of motion  -     Assessment Comments Mrs. Richey is a 32 y/o female who presents to physical therapy due to a R ankle sprain that occurred on 3/29/23. She reports currently experiencing pain/difficult with prolong walking/standing, twist/pivot, ankle motions, pushing gas/break, and standing on one leg. Upon evaluation, she presents with pain, TTP, decreased ROM, decreased ankle strength, ankle instability, decreased flexibility, slight decreased balance (compared to L), and mild gait deviations. Mrs. Richey would benefit from skilled physical therapy to address the above deficits in order for her to return to her prior level of function and independent status.  -     Rehab Potential Good  -     Patient/caregiver participated in establishment of treatment plan and goals Yes  -     Patient would benefit from skilled therapy intervention Yes  -        PT Plan    PT Frequency 1x/week  -     Predicted Duration of Therapy Intervention (PT) 4 weeks  -     Planned CPT's? PT EVAL LOW COMPLEXITY: 15337;PT RE-EVAL: 03286;PT THER ACT EA 15 MIN: 49061;PT THER PROC EA 15 MIN: 61047;PT MANUAL THERAPY EA 15 MIN: 93591;PT GAIT TRAINING EA 15 MIN: 74086;PT NEUROMUSC RE-EDUCATION EA 15 MIN: 40498;PT AQUATIC THERAPY EA 15 MIN: 88583;PT SELF CARE/HOME MGMT/TRAIN EA 15: 23567;PT ELECTRICAL STIM UNATTEND: ;PT ULTRASOUND EA 15 MIN: 44752;PT HOT/COLD PACK WC NONMCARE: 05481;PT INITIAL ORTHOTIC MGMT/TRAIN EA 15 MIN: 67403;PT SUBSEQUENT ORTHOTIC/PROSTHETIC TRAIN: 65205;PT SELF CARE/MGMT/TRAIN 15 MIN: 67832;PT THER SUPP EA 15  MIN  -     PT Plan Comments ROM, ankle strength/stability, foot intrinsic strength, gait (focus on eliminating IV/sup), stretching, balance, modalities and manual as needed.  -           User Key  (r) = Recorded By, (t) = Taken By, (c) = Cosigned By    Initials Name Provider Type    Helena Borges, LEONEL Physical Therapist                   OP Exercises     Row Name 04/27/23 0800             Subjective Comments    Subjective Comments See therapy pt hx  -         Subjective Pain    Able to rate subjective pain? yes  -      Pre-Treatment Pain Level 1  -      Post-Treatment Pain Level 1  -         Exercise 1    Exercise Name 1 HEP: ROM DF/PF IV/EV, gastroc S with strap, standing HR/TR  -            User Key  (r) = Recorded By, (t) = Taken By, (c) = Cosigned By    Initials Name Provider Type    Helena Borges, PT Physical Therapist                              Outcome Measure Options: Lower Extremity Functional Scale (LEFS)  Lower Extremity Functional Index  Any of your usual work, housework or school activities: No difficulty  Your usual hobbies, recreational or sporting activities: Quite a bit of difficulty  Getting into or out of the bath: No difficulty  Walking between rooms: No difficulty  Putting on your shoes or socks: No difficulty  Squatting: No difficulty  Lifting an object, like a bag of groceries from the floor: No difficulty  Performing light activities around your home: No difficulty  Performing heavy activities around your home: A little bit of difficulty  Getting into or out of a car: No difficulty  Walking 2 blocks: A little bit of difficulty  Walking a mile: A little bit of difficulty  Going up or down 10 stairs (about 1 flight of stairs): A little bit of difficulty  Standing for 1 hour: A little bit of difficulty  Sitting for 1 hour: No difficulty  Running on even ground: Extreme difficulty or unable to perform activity  Running on uneven ground: Extreme difficulty or unable to  perform activity  Making sharp turns while running fast: Extreme difficulty or unable to perform activity  Hopping: Extreme difficulty or unable to perform activity  Rolling over in bed: No difficulty  Total: 56      Time Calculation:     Start Time: 0850  Stop Time: 0930  Time Calculation (min): 40 min     Therapy Charges for Today     Code Description Service Date Service Provider Modifiers Qty    69538188646 HC PT EVAL LOW COMPLEXITY 3 4/27/2023 Helena Smith, PT GP 1          PT G-Codes  Outcome Measure Options: Lower Extremity Functional Scale (LEFS)  Total: 56         Helena Smith PT, DPT  4/27/2023

## 2023-05-02 ENCOUNTER — OFFICE VISIT (OUTPATIENT)
Dept: FAMILY MEDICINE CLINIC | Facility: CLINIC | Age: 33
End: 2023-05-02
Payer: COMMERCIAL

## 2023-05-02 ENCOUNTER — HOSPITAL ENCOUNTER (OUTPATIENT)
Dept: PHYSICAL THERAPY | Facility: HOSPITAL | Age: 33
Setting detail: THERAPIES SERIES
Discharge: HOME | End: 2023-05-02
Payer: COMMERCIAL

## 2023-05-02 VITALS
OXYGEN SATURATION: 99 % | SYSTOLIC BLOOD PRESSURE: 116 MMHG | WEIGHT: 143.5 LBS | HEIGHT: 64 IN | DIASTOLIC BLOOD PRESSURE: 68 MMHG | HEART RATE: 90 BPM | BODY MASS INDEX: 24.5 KG/M2

## 2023-05-02 DIAGNOSIS — Z13.228 SCREENING FOR METABOLIC DISORDER: ICD-10-CM

## 2023-05-02 DIAGNOSIS — Z11.59 NEED FOR HEPATITIS C SCREENING TEST: ICD-10-CM

## 2023-05-02 DIAGNOSIS — Z13.220 SCREENING FOR HYPERCHOLESTEROLEMIA: ICD-10-CM

## 2023-05-02 DIAGNOSIS — Z13.29 SCREENING FOR THYROID DISORDER: ICD-10-CM

## 2023-05-02 DIAGNOSIS — R06.83 SNORING: Primary | ICD-10-CM

## 2023-05-02 DIAGNOSIS — M25.571 ACUTE RIGHT ANKLE PAIN: Primary | ICD-10-CM

## 2023-05-02 DIAGNOSIS — S93.491A SPRAIN OF ANTERIOR TALOFIBULAR LIGAMENT OF RIGHT ANKLE, INITIAL ENCOUNTER: ICD-10-CM

## 2023-05-02 PROCEDURE — 99213 OFFICE O/P EST LOW 20 MIN: CPT | Performed by: NURSE PRACTITIONER

## 2023-05-02 PROCEDURE — 97140 MANUAL THERAPY 1/> REGIONS: CPT

## 2023-05-02 PROCEDURE — 97110 THERAPEUTIC EXERCISES: CPT

## 2023-05-02 NOTE — PROGRESS NOTES
"Chief Complaint  Establish Care (New PT )    Subjective    Encounter to establish primary care.  Concern is her dentist however she may need a sleep study for snoring.  Denies daytime fatigue, drowsiness and apneic episodes.  \"He said that sometimes grinding her teeth and snoring can be a sign that you have sleep apnea.  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  Scheduled for breat augmentation with Dr. Franco on 05/08/2023.          Mackenzie Richey presents to [facility] [place]  Snoring  This is a chronic problem. The current episode started more than 1 year ago. The problem occurs every several days. The problem has been waxing and waning. The symptoms are aggravated by stress. She has tried nothing for the symptoms. The treatment provided no relief.     Current Outpatient Medications on File Prior to Visit   Medication Sig Dispense Refill   • cyclobenzaprine (FLEXERIL) 10 MG tablet Take 1 tablet by mouth Every 8 (Eight) Hours As Needed for muscle spasms. 30 tablet 0   • doxycycline (MONODOX) 100 MG capsule Take 1 capsule by mouth 2 (Two) Times a Day until gone. 14 capsule 0   • HYDROcodone-acetaminophen (NORCO) 5-325 MG per tablet Take 1 tablet by mouth every 4 hours as needed for pain. 29 tablet 0   • meloxicam (MOBIC) 15 MG tablet Take 1 tablet by mouth Daily **Take with food** 30 tablet 0   • ondansetron ODT (ZOFRAN-ODT) 8 MG disintegrating tablet Dissolve 1 tablet on the tongue every 8 (eight) hours as needed for nausea. 20 tablet 0     No current facility-administered medications on file prior to visit.     No Known Allergies    Objective   Vital Signs:  /68   Pulse 90   Ht 162.6 cm (64\")   Wt 65.1 kg (143 lb 8 oz)   SpO2 99%   BMI 24.63 kg/m²   Estimated body mass index is 24.63 kg/m² as calculated from the following:    Height as of this encounter: 162.6 cm (64\").    Weight as of this encounter: 65.1 kg (143 lb 8 oz).   "     BMI is within normal parameters. No other follow-up for BMI required.      Physical Exam  Vitals and nursing note reviewed.   Constitutional:       Appearance: Normal appearance. She is well-developed. She is obese.   HENT:      Head: Normocephalic and atraumatic.      Right Ear: Tympanic membrane, ear canal and external ear normal.      Left Ear: Tympanic membrane, ear canal and external ear normal.      Nose: Nose normal.      Mouth/Throat:      Mouth: Mucous membranes are moist.      Pharynx: Oropharynx is clear.   Eyes:      Extraocular Movements: Extraocular movements intact.      Conjunctiva/sclera: Conjunctivae normal.      Pupils: Pupils are equal, round, and reactive to light.   Cardiovascular:      Rate and Rhythm: Normal rate and regular rhythm.      Pulses: Normal pulses.      Heart sounds: Normal heart sounds.   Pulmonary:      Effort: Pulmonary effort is normal.      Breath sounds: Normal breath sounds.   Abdominal:      General: Bowel sounds are normal.      Palpations: Abdomen is soft.   Musculoskeletal:         General: Normal range of motion.      Cervical back: Normal range of motion and neck supple.   Skin:     General: Skin is warm.      Capillary Refill: Capillary refill takes less than 2 seconds.   Neurological:      Mental Status: She is alert and oriented to person, place, and time.   Psychiatric:         Behavior: Behavior normal.        Result Review :                   Assessment and Plan   Diagnoses and all orders for this visit:    1. Snoring (Primary)    2. Need for hepatitis C screening test  -     Hepatitis C Antibody; Future    3. Screening for metabolic disorder  -     CBC & Differential; Future  -     Comprehensive Metabolic Panel; Future    4. Screening for hypercholesterolemia  -     Lipid panel; Future    5. Screening for thyroid disorder  -     TSH; Future      1.  Snoring:  Discussed possibility of referral to sleep medicine but request to hold off at this time until  completion of nursing school and surgery has been completed  May use Breathe Right strips OTC according to package directions    2.  Need for hepatitis C screening test:  Complete hepatitis C antibody as ordered will notify of results when available    3.  Screening for metabolic disorder:  Complete CBC chemistry panel as ordered and will notify of results when available    4.  Screening for hypercholesterolemia:  Complete fasting lipid panel as ordered will notify of results when available    5.  Screening for thyroid disorder:  Complete TSH as ordered will notify of results when available       Follow Up   Return in about 1 year (around 5/2/2024) for Annual physical.  Patient was given instructions and counseling regarding her condition or for health maintenance advice. Please see specific information pulled into the AVS if appropriate.         This document has been electronically signed by CARLOTTA Aquino on May 2, 2023 13:59 CDT

## 2023-05-02 NOTE — THERAPY TREATMENT NOTE
Outpatient Physical Therapy Ortho Treatment Note  [facility]     Patient Name: Mackenzie Richey  : 1990  MRN: [id number]  Today's Date: 2023      Visit Date: 2023     Attendance: 2/2 (eval + 4 until 23)  Subjective Improvement: 0%  Next MD Visit: 23  Recert Date: 23     Therapy Diagnosis: R ankle sprain    Visit Dx:    ICD-10-CM ICD-9-CM   1. Acute right ankle pain  M25.571 719.47     338.19   2. Sprain of anterior talofibular ligament of right ankle, initial encounter  S93.491A 845.09       Patient Active Problem List   Diagnosis   • Anxiety        Past Medical History:   Diagnosis Date   • No known problems         Past Surgical History:   Procedure Laterality Date   •  SECTION     • D & C WITH SUCTION          PT Ortho     Row Name 23 1100       Posture/Observations    Posture/Observations Comments gait non-antalgic; minimal pocket of edema just anterior to lateral malleoli  -          User Key  (r) = Recorded By, (t) = Taken By, (c) = Cosigned By    Initials Name Provider Type     Asmita Diallo PTA Physical Therapist Assistant                             PT Assessment/Plan     Row Name 23 1100          PT Assessment    Functional Limitations Impaired gait;Performance in leisure activities;Performance in work activities  -     Impairments Balance;Edema;Gait;Impaired flexibility;Impaired muscle endurance;Impaired muscle length;Impaired muscle power;Joint integrity;Joint mobility;Muscle strength;Pain;Poor body mechanics;Posture;Range of motion  -     Assessment Comments SLS and calf raises improved no pain or popping noted; responded well to manual to improved lengthening of anterior tib and gastroc for ankle flexiblity  -     Rehab Potential Good  -     Patient/caregiver participated in establishment of treatment plan and goals Yes  -     Patient would benefit from skilled therapy intervention Yes  -        PT Plan    PT  "Frequency 1x/week  -KH     Predicted Duration of Therapy Intervention (PT) 4 weeks  -KH     PT Plan Comments Progress CKC with BOSU and airex and assess yoga poses next visit;  -KH           User Key  (r) = Recorded By, (t) = Taken By, (c) = Cosigned By    Initials Name Provider Type    Asmita Colin PTA Physical Therapist Assistant                   OP Exercises     Row Name 05/02/23 1100             Subjective Comments    Subjective Comments Doing better but still fearful to do some yoga poses due to flexibilty  -KH         Subjective Pain    Able to rate subjective pain? yes  -KH      Pre-Treatment Pain Level 0  -KH      Post-Treatment Pain Level 0  -KH      Subjective Pain Comment will ice at home  -KH         Exercise 1    Exercise Name 1 pro ll LE strengt  -KH      Cueing 1 Verbal  -KH      Time 1 10mins  -KH      Additional Comments level 4; seat 8  -KH         Exercise 2    Exercise Name 2 Manual: see flowsheet  -KH      Time 2 10 mins  -KH         Exercise 3    Exercise Name 3 T-Band Ankle 4 way  -KH      Cueing 3 Demo;Verbal  -KH      Sets 3 2  -KH      Reps 3 10 each  -KH      Additional Comments Green  -KH         Exercise 4    Exercise Name 4 SLS  -KH      Cueing 4 Verbal  -KH      Sets 4 5  -KH      Time 4 10\" holds  -KH         Exercise 5    Exercise Name 5 Standing CR with hold  -KH      Cueing 5 Verbal;Demo  -KH      Sets 5 2  -KH      Reps 5 10  -KH      Time 5 3\" holds  -KH            User Key  (r) = Recorded By, (t) = Taken By, (c) = Cosigned By    Initials Name Provider Type    Asmita Colin PTA Physical Therapist Assistant                         Manual Rx (last 36 hours)     Manual Treatments     Row Name 05/02/23 1100             Manual Rx 1    Manual Rx 1 Location R gastroc/anterior tib  -KH      Manual Rx 1 Type STM/MFR  -KH      Manual Rx 1 Duration 8 mins  -KH         Manual Rx 2    Manual Rx 2 Location R ankle  -KH      Manual Rx 2 Type distraction/PROM with gentle jt mobes  -KH      " Manual Rx 2 Duration 2 mins  -            User Key  (r) = Recorded By, (t) = Taken By, (c) = Cosigned By    Initials Name Provider Type    Asmita Colin PTA Physical Therapist Assistant                 PT OP Goals     Row Name 05/02/23 1100          PT Short Term Goals    STG Date to Achieve 05/25/23  -     STG 1 Pt will be able to maintain SLS on foam mat for 10 sec without UE use and without falling into IV/supination.  -     STG 2 PT’s ankle MMT will improve to 5/5 grossly.  -     STG 3 Pt’s DF AROM will improve to at least 8 deg.  -     STG 4 Pt's PF AROM will improve to at least 50 deg  -        Time Calculation    PT Goal Re-Cert Due Date 05/18/23  -           User Key  (r) = Recorded By, (t) = Taken By, (c) = Cosigned By    Initials Name Provider Type    Asmita Colin PTA Physical Therapist Assistant                Therapy Education  Education Details: T-band Ankle 4 way  Given: HEP  Program: New, Progressed  How Provided: Verbal, Demonstration, Written  Provided to: Patient  Level of Understanding: Teach back education performed, Verbalized, Demonstrated              Time Calculation:   Start Time: 1055  Stop Time: 1143  Time Calculation (min): 48 min  Therapy Charges for Today     Code Description Service Date Service Provider Modifiers Qty    59383581231  PT MANUAL THERAPY EA 15 MIN 5/2/2023 Asmita Diallo PTA GP, CQ 1    38198766708 HC PT THER PROC EA 15 MIN 5/2/2023 Asmita Diallo PTA GP, CQ 2                    Asmita Diallo PTA  5/2/2023

## 2023-05-02 NOTE — PROGRESS NOTES
"Chief Complaint  Establish Care (New PT )    Subjective        Mackenzie Richye presents to [facility] [place]  History of Present Illness    Objective   Vital Signs:  /68   Pulse 90   Ht 162.6 cm (64\")   Wt 65.1 kg (143 lb 8 oz)   SpO2 99%   BMI 24.63 kg/m²   Estimated body mass index is 24.63 kg/m² as calculated from the following:    Height as of this encounter: 162.6 cm (64\").    Weight as of this encounter: 65.1 kg (143 lb 8 oz).       BMI is within normal parameters. No other follow-up for BMI required.      Physical Exam   Result Review :                   Assessment and Plan   There are no diagnoses linked to this encounter.         Follow Up   No follow-ups on file.  Patient was given instructions and counseling regarding her condition or for health maintenance advice. Please see specific information pulled into the AVS if appropriate.       "

## 2023-05-10 ENCOUNTER — APPOINTMENT (OUTPATIENT)
Dept: PHYSICAL THERAPY | Facility: HOSPITAL | Age: 33
End: 2023-05-10
Payer: COMMERCIAL

## 2023-05-22 ENCOUNTER — LAB (OUTPATIENT)
Dept: LAB | Facility: HOSPITAL | Age: 33
End: 2023-05-22
Payer: COMMERCIAL

## 2023-05-22 DIAGNOSIS — Z13.220 SCREENING FOR HYPERCHOLESTEROLEMIA: ICD-10-CM

## 2023-05-22 DIAGNOSIS — Z13.29 SCREENING FOR THYROID DISORDER: ICD-10-CM

## 2023-05-22 DIAGNOSIS — Z13.228 SCREENING FOR METABOLIC DISORDER: ICD-10-CM

## 2023-05-22 DIAGNOSIS — Z11.59 NEED FOR HEPATITIS C SCREENING TEST: ICD-10-CM

## 2023-05-22 LAB
ALBUMIN SERPL-MCNC: 5 G/DL (ref 3.5–5.2)
ALBUMIN/GLOB SERPL: 1.7 G/DL
ALP SERPL-CCNC: 49 U/L (ref 39–117)
ALT SERPL W P-5'-P-CCNC: 17 U/L (ref 1–33)
ANION GAP SERPL CALCULATED.3IONS-SCNC: 12 MMOL/L (ref 5–15)
AST SERPL-CCNC: 24 U/L (ref 1–32)
BILIRUB SERPL-MCNC: 0.5 MG/DL (ref 0–1.2)
BUN SERPL-MCNC: 14 MG/DL (ref 6–20)
BUN/CREAT SERPL: 23.3 (ref 7–25)
CALCIUM SPEC-SCNC: 9.2 MG/DL (ref 8.6–10.5)
CHLORIDE SERPL-SCNC: 101 MMOL/L (ref 98–107)
CHOLEST SERPL-MCNC: 200 MG/DL (ref 0–200)
CO2 SERPL-SCNC: 24 MMOL/L (ref 22–29)
CREAT SERPL-MCNC: 0.6 MG/DL (ref 0.57–1)
EGFRCR SERPLBLD CKD-EPI 2021: 120.2 ML/MIN/1.73
GLOBULIN UR ELPH-MCNC: 3 GM/DL
GLUCOSE SERPL-MCNC: 85 MG/DL (ref 65–99)
HCV AB SER DONR QL: NORMAL
HDLC SERPL-MCNC: 66 MG/DL (ref 40–60)
LDLC SERPL CALC-MCNC: 123 MG/DL (ref 0–100)
LDLC/HDLC SERPL: 1.85 {RATIO}
POTASSIUM SERPL-SCNC: 4 MMOL/L (ref 3.5–5.2)
PROT SERPL-MCNC: 8 G/DL (ref 6–8.5)
SODIUM SERPL-SCNC: 137 MMOL/L (ref 136–145)
TRIGL SERPL-MCNC: 61 MG/DL (ref 0–150)
TSH SERPL DL<=0.05 MIU/L-ACNC: 1.62 UIU/ML (ref 0.27–4.2)
VLDLC SERPL-MCNC: 11 MG/DL (ref 5–40)

## 2023-05-22 PROCEDURE — 80053 COMPREHEN METABOLIC PANEL: CPT

## 2023-05-22 PROCEDURE — 80061 LIPID PANEL: CPT

## 2023-05-22 PROCEDURE — 84443 ASSAY THYROID STIM HORMONE: CPT

## 2023-05-22 PROCEDURE — 36415 COLL VENOUS BLD VENIPUNCTURE: CPT

## 2023-05-22 PROCEDURE — 86803 HEPATITIS C AB TEST: CPT

## 2023-05-23 ENCOUNTER — TELEPHONE (OUTPATIENT)
Dept: FAMILY MEDICINE CLINIC | Facility: CLINIC | Age: 33
End: 2023-05-23
Payer: COMMERCIAL

## 2023-05-23 NOTE — TELEPHONE ENCOUNTER
Per CARLOTTA Waggoner, Ms. Richey has been called with recent lab results & recommendations.  Continue current medications and follow-up as planned or sooner if any problems.

## 2023-05-23 NOTE — TELEPHONE ENCOUNTER
----- Message from CARLOTTA Aquino sent at 5/23/2023  6:51 AM CDT -----  Slight elevation in bad cholesterol.  She needs to reduce saturated fats in her diet and increase low-impact exercises such as walking.  All other labs were essentially normal.

## 2023-06-09 ENCOUNTER — LAB (OUTPATIENT)
Dept: LAB | Facility: HOSPITAL | Age: 33
End: 2023-06-09
Payer: COMMERCIAL

## 2023-06-09 DIAGNOSIS — Z13.228 SCREENING FOR METABOLIC DISORDER: ICD-10-CM

## 2023-06-09 PROCEDURE — 36415 COLL VENOUS BLD VENIPUNCTURE: CPT

## 2023-06-09 PROCEDURE — 85025 COMPLETE CBC W/AUTO DIFF WBC: CPT

## 2023-06-10 LAB
BASOPHILS # BLD AUTO: 0.05 10*3/MM3 (ref 0–0.2)
BASOPHILS NFR BLD AUTO: 0.7 % (ref 0–1.5)
DEPRECATED RDW RBC AUTO: 41.5 FL (ref 37–54)
EOSINOPHIL # BLD AUTO: 0.12 10*3/MM3 (ref 0–0.4)
EOSINOPHIL NFR BLD AUTO: 1.7 % (ref 0.3–6.2)
ERYTHROCYTE [DISTWIDTH] IN BLOOD BY AUTOMATED COUNT: 12.7 % (ref 12.3–15.4)
HCT VFR BLD AUTO: 38.9 % (ref 34–46.6)
HGB BLD-MCNC: 12.9 G/DL (ref 12–15.9)
IMM GRANULOCYTES # BLD AUTO: 0.02 10*3/MM3 (ref 0–0.05)
IMM GRANULOCYTES NFR BLD AUTO: 0.3 % (ref 0–0.5)
LYMPHOCYTES # BLD AUTO: 2.17 10*3/MM3 (ref 0.7–3.1)
LYMPHOCYTES NFR BLD AUTO: 30.6 % (ref 19.6–45.3)
MCH RBC QN AUTO: 29.5 PG (ref 26.6–33)
MCHC RBC AUTO-ENTMCNC: 33.2 G/DL (ref 31.5–35.7)
MCV RBC AUTO: 89 FL (ref 79–97)
MONOCYTES # BLD AUTO: 0.45 10*3/MM3 (ref 0.1–0.9)
MONOCYTES NFR BLD AUTO: 6.3 % (ref 5–12)
NEUTROPHILS NFR BLD AUTO: 4.29 10*3/MM3 (ref 1.7–7)
NEUTROPHILS NFR BLD AUTO: 60.4 % (ref 42.7–76)
NRBC BLD AUTO-RTO: 0 /100 WBC (ref 0–0.2)
PLATELET # BLD AUTO: 230 10*3/MM3 (ref 140–450)
PMV BLD AUTO: 11.3 FL (ref 6–12)
RBC # BLD AUTO: 4.37 10*6/MM3 (ref 3.77–5.28)
WBC NRBC COR # BLD: 7.1 10*3/MM3 (ref 3.4–10.8)